# Patient Record
Sex: FEMALE | Race: WHITE | Employment: FULL TIME | URBAN - NONMETROPOLITAN AREA
[De-identification: names, ages, dates, MRNs, and addresses within clinical notes are randomized per-mention and may not be internally consistent; named-entity substitution may affect disease eponyms.]

---

## 2018-03-23 ENCOUNTER — HOSPITAL ENCOUNTER (OUTPATIENT)
Dept: WOMENS IMAGING | Age: 48
Discharge: HOME | End: 2018-03-25
Payer: COMMERCIAL

## 2018-03-23 DIAGNOSIS — Z12.39 ENCOUNTER FOR BREAST CANCER SCREENING OTHER THAN MAMMOGRAM: ICD-10-CM

## 2018-03-23 PROCEDURE — 77067 SCR MAMMO BI INCL CAD: CPT

## 2019-02-14 ENCOUNTER — TELEPHONE (OUTPATIENT)
Dept: GASTROENTEROLOGY | Age: 49
End: 2019-02-14

## 2019-02-14 DIAGNOSIS — R19.4 CHANGE IN BOWEL HABIT: Primary | ICD-10-CM

## 2019-02-14 RX ORDER — SODIUM, POTASSIUM,MAG SULFATES 17.5-3.13G
SOLUTION, RECONSTITUTED, ORAL ORAL
Qty: 2 BOTTLE | Refills: 0 | Status: ON HOLD | OUTPATIENT
Start: 2019-02-14 | End: 2019-02-25 | Stop reason: HOSPADM

## 2019-02-19 RX ORDER — RANITIDINE 150 MG/1
150 TABLET ORAL 2 TIMES DAILY PRN
COMMUNITY

## 2019-02-19 RX ORDER — LEVOTHYROXINE SODIUM 0.07 MG/1
75 TABLET ORAL DAILY
COMMUNITY

## 2019-02-25 ENCOUNTER — HOSPITAL ENCOUNTER (OUTPATIENT)
Age: 49
Setting detail: OUTPATIENT SURGERY
Discharge: HOME | End: 2019-02-25
Attending: INTERNAL MEDICINE | Admitting: INTERNAL MEDICINE
Payer: COMMERCIAL

## 2019-02-25 ENCOUNTER — ANESTHESIA (OUTPATIENT)
Dept: OPERATING ROOM | Age: 49
End: 2019-02-25
Payer: COMMERCIAL

## 2019-02-25 ENCOUNTER — ANESTHESIA EVENT (OUTPATIENT)
Dept: OPERATING ROOM | Age: 49
End: 2019-02-25
Payer: COMMERCIAL

## 2019-02-25 VITALS
DIASTOLIC BLOOD PRESSURE: 63 MMHG | SYSTOLIC BLOOD PRESSURE: 110 MMHG | BODY MASS INDEX: 28.7 KG/M2 | HEIGHT: 63 IN | WEIGHT: 162 LBS | OXYGEN SATURATION: 99 % | TEMPERATURE: 97.4 F | RESPIRATION RATE: 16 BRPM | HEART RATE: 70 BPM

## 2019-02-25 VITALS
OXYGEN SATURATION: 97 % | SYSTOLIC BLOOD PRESSURE: 86 MMHG | DIASTOLIC BLOOD PRESSURE: 44 MMHG | RESPIRATION RATE: 16 BRPM

## 2019-02-25 PROCEDURE — 2580000003 HC RX 258: Performed by: INTERNAL MEDICINE

## 2019-02-25 PROCEDURE — 7100000010 HC PHASE II RECOVERY - FIRST 15 MIN: Performed by: INTERNAL MEDICINE

## 2019-02-25 PROCEDURE — 3700000001 HC ADD 15 MINUTES (ANESTHESIA): Performed by: INTERNAL MEDICINE

## 2019-02-25 PROCEDURE — 6360000002 HC RX W HCPCS: Performed by: NURSE ANESTHETIST, CERTIFIED REGISTERED

## 2019-02-25 PROCEDURE — 2500000003 HC RX 250 WO HCPCS: Performed by: NURSE ANESTHETIST, CERTIFIED REGISTERED

## 2019-02-25 PROCEDURE — 88305 TISSUE EXAM BY PATHOLOGIST: CPT

## 2019-02-25 PROCEDURE — 45380 COLONOSCOPY AND BIOPSY: CPT | Performed by: INTERNAL MEDICINE

## 2019-02-25 PROCEDURE — 2709999900 HC NON-CHARGEABLE SUPPLY: Performed by: INTERNAL MEDICINE

## 2019-02-25 PROCEDURE — 7100000011 HC PHASE II RECOVERY - ADDTL 15 MIN: Performed by: INTERNAL MEDICINE

## 2019-02-25 PROCEDURE — 3609010300 HC COLONOSCOPY W/BIOPSY SINGLE/MULTIPLE: Performed by: INTERNAL MEDICINE

## 2019-02-25 PROCEDURE — 3700000000 HC ANESTHESIA ATTENDED CARE: Performed by: INTERNAL MEDICINE

## 2019-02-25 RX ORDER — PROPOFOL 10 MG/ML
INJECTION, EMULSION INTRAVENOUS CONTINUOUS PRN
Status: DISCONTINUED | OUTPATIENT
Start: 2019-02-25 | End: 2019-02-25 | Stop reason: SDUPTHER

## 2019-02-25 RX ORDER — PROPOFOL 10 MG/ML
INJECTION, EMULSION INTRAVENOUS PRN
Status: DISCONTINUED | OUTPATIENT
Start: 2019-02-25 | End: 2019-02-25 | Stop reason: SDUPTHER

## 2019-02-25 RX ORDER — LIDOCAINE HYDROCHLORIDE 20 MG/ML
INJECTION, SOLUTION INFILTRATION; PERINEURAL PRN
Status: DISCONTINUED | OUTPATIENT
Start: 2019-02-25 | End: 2019-02-25 | Stop reason: SDUPTHER

## 2019-02-25 RX ORDER — SODIUM CHLORIDE, SODIUM LACTATE, POTASSIUM CHLORIDE, CALCIUM CHLORIDE 600; 310; 30; 20 MG/100ML; MG/100ML; MG/100ML; MG/100ML
INJECTION, SOLUTION INTRAVENOUS CONTINUOUS
Status: DISCONTINUED | OUTPATIENT
Start: 2019-02-25 | End: 2019-02-25 | Stop reason: HOSPADM

## 2019-02-25 RX ADMIN — PROPOFOL 80 MG: 10 INJECTION, EMULSION INTRAVENOUS at 16:11

## 2019-02-25 RX ADMIN — SODIUM CHLORIDE, POTASSIUM CHLORIDE, SODIUM LACTATE AND CALCIUM CHLORIDE: 600; 310; 30; 20 INJECTION, SOLUTION INTRAVENOUS at 14:13

## 2019-02-25 RX ADMIN — PROPOFOL 30 MG: 10 INJECTION, EMULSION INTRAVENOUS at 16:16

## 2019-02-25 RX ADMIN — PROPOFOL 200 MCG/KG/MIN: 10 INJECTION, EMULSION INTRAVENOUS at 16:11

## 2019-02-25 RX ADMIN — LIDOCAINE HYDROCHLORIDE 100 MG: 20 INJECTION, SOLUTION INFILTRATION; PERINEURAL at 16:11

## 2019-02-25 ASSESSMENT — PAIN SCALES - GENERAL
PAINLEVEL_OUTOF10: 0

## 2019-02-25 ASSESSMENT — PAIN - FUNCTIONAL ASSESSMENT: PAIN_FUNCTIONAL_ASSESSMENT: 0-10

## 2019-02-27 LAB — SURGICAL PATHOLOGY REPORT: NORMAL

## 2019-02-28 ENCOUNTER — HOSPITAL ENCOUNTER (OUTPATIENT)
Dept: NON INVASIVE DIAGNOSTICS | Age: 49
Discharge: HOME | End: 2019-02-28
Payer: COMMERCIAL

## 2019-02-28 LAB
LV EF: 55 %
LVEF MODALITY: NORMAL

## 2019-02-28 PROCEDURE — 93306 TTE W/DOPPLER COMPLETE: CPT

## 2019-03-25 ENCOUNTER — HOSPITAL ENCOUNTER (OUTPATIENT)
Dept: WOMENS IMAGING | Age: 49
Discharge: HOME | End: 2019-03-27
Payer: COMMERCIAL

## 2019-03-25 DIAGNOSIS — Z12.39 SCREENING BREAST EXAMINATION: ICD-10-CM

## 2019-03-25 PROCEDURE — 77063 BREAST TOMOSYNTHESIS BI: CPT

## 2020-06-17 ENCOUNTER — HOSPITAL ENCOUNTER (OUTPATIENT)
Dept: WOMENS IMAGING | Age: 50
Discharge: HOME | End: 2020-06-19
Payer: COMMERCIAL

## 2020-06-17 PROCEDURE — 77063 BREAST TOMOSYNTHESIS BI: CPT

## 2020-08-11 ENCOUNTER — HOSPITAL ENCOUNTER (EMERGENCY)
Age: 50
Discharge: HOME | End: 2020-08-11
Attending: EMERGENCY MEDICINE
Payer: COMMERCIAL

## 2020-08-11 VITALS
RESPIRATION RATE: 12 BRPM | DIASTOLIC BLOOD PRESSURE: 63 MMHG | BODY MASS INDEX: 30.12 KG/M2 | HEIGHT: 63 IN | OXYGEN SATURATION: 97 % | SYSTOLIC BLOOD PRESSURE: 145 MMHG | HEART RATE: 66 BPM | WEIGHT: 170 LBS | TEMPERATURE: 97.8 F

## 2020-08-11 LAB
DIRECT EXAM: NORMAL
Lab: NORMAL
SPECIMEN DESCRIPTION: NORMAL

## 2020-08-11 PROCEDURE — 87880 STREP A ASSAY W/OPTIC: CPT

## 2020-08-11 PROCEDURE — 99283 EMERGENCY DEPT VISIT LOW MDM: CPT

## 2020-08-11 PROCEDURE — 6370000000 HC RX 637 (ALT 250 FOR IP): Performed by: EMERGENCY MEDICINE

## 2020-08-11 RX ORDER — IBUPROFEN 800 MG/1
800 TABLET ORAL EVERY 8 HOURS PRN
Qty: 21 TABLET | Refills: 0 | Status: SHIPPED | OUTPATIENT
Start: 2020-08-11

## 2020-08-11 RX ORDER — IBUPROFEN 800 MG/1
800 TABLET ORAL ONCE
Status: COMPLETED | OUTPATIENT
Start: 2020-08-11 | End: 2020-08-11

## 2020-08-11 RX ADMIN — BENZOCAINE AND MENTHOL 1 LOZENGE: 15; 3.6 LOZENGE ORAL at 09:54

## 2020-08-11 RX ADMIN — IBUPROFEN 800 MG: 800 TABLET, FILM COATED ORAL at 09:54

## 2020-08-11 ASSESSMENT — ENCOUNTER SYMPTOMS
CONSTIPATION: 0
SHORTNESS OF BREATH: 0
DIARRHEA: 0
SORE THROAT: 1
WHEEZING: 0
COUGH: 1
VOMITING: 0
RHINORRHEA: 0
NAUSEA: 0
ABDOMINAL DISTENTION: 0

## 2020-08-11 ASSESSMENT — PAIN SCALES - GENERAL
PAINLEVEL_OUTOF10: 7
PAINLEVEL_OUTOF10: 7

## 2020-08-11 ASSESSMENT — PAIN DESCRIPTION - PAIN TYPE: TYPE: ACUTE PAIN

## 2020-08-11 ASSESSMENT — PAIN DESCRIPTION - LOCATION: LOCATION: THROAT

## 2020-08-11 NOTE — ED PROVIDER NOTES
[facility] ED  Emergency Department        Pt Name: Myra Phalen  MRN: [id number]  [place] 1970  Date of evaluation: 8/11/20    CHIEF COMPLAINT       Chief Complaint   Patient presents with    Pharyngitis     Onset of sore throat headache and cough 2 days ago. Reported to work this morning and was sent home. Temp 97.8    Cough       HISTORY OF PRESENT ILLNESS  (Location/Symptom, Timing/Onset, Context/Setting, Quality, Duration, ModifyingFactors, Severity.)      Myra Phalen is a 47 y.o. female who presents with sore throat that started yesterday, and cough, also present with headache and feeling post nasal drip. No fever, no chest pain, no body aches. She is tolerating oral intake. was sent from work for ER evaluation, patient took sudafed without relief of symptom. PAST MEDICAL / SURGICAL / SOCIAL / FAMILY HISTORY      has a past medical history of Polymyositis ([address] and Thyroid disease. has a past surgical history that includes Hysterectomy (2013); Colonoscopy (02/25/2019); and Colonoscopy (N/A, 2/25/2019).        Social History     Socioeconomic History    Marital status:      Spouse name: Not on file    Number of children: Not on file    Years of education: Not on file    Highest education level: Not on file   Occupational History    Not on file   Social Needs    Financial resource strain: Not on file    Food insecurity     Worry: Not on file     Inability: Not on file    Transportation needs     Medical: Not on file     Non-medical: Not on file   Tobacco Use    Smoking status: Never Smoker    Smokeless tobacco: Never Used   Substance and Sexual Activity    Alcohol use: Not Currently    Drug use: Never    Sexual activity: Not on file   Lifestyle    Physical activity     Days per week: Not on file     Minutes per session: Not on file    Stress: Not on file   Relationships    Social connections     Talks on phone: Not on file     Gets together: Not on file Attends Denominational service: Not on file     Active member of club or organization: Not on file     Attends meetings of clubs or organizations: Not on file     Relationship status: Not on file    Intimate partner violence     Fear of current or ex partner: Not on file     Emotionally abused: Not on file     Physically abused: Not on file     Forced sexual activity: Not on file   Other Topics Concern    Not on file   Social History Narrative    Not on file       History reviewed. No pertinent family history. Allergies:  Pantoprazole    Home Medications:  Prior to Admission medications    Medication Sig Start Date End Date Taking? Authorizing Provider   ibuprofen (IBU) 800 MG tablet Take 1 tablet by mouth every 8 hours as needed for Pain 8/11/20  Yes Alma Matthews, DO   benzocaine-menthol (CEPACOL SORE THROAT) 15-3.6 MG lozenge Take 1 lozenge by mouth every 2 hours as needed for Sore Throat 8/11/20  Yes Alma Matthews, DO   Multiple Vitamins-Calcium (ONE-A-DAY WITHIN PO) Take by mouth   Yes Historical Provider, MD   levothyroxine (SYNTHROID) 75 MCG tablet Take 75 mcg by mouth Daily   Yes Historical Provider, MD   ranitidine (ZANTAC) 150 MG tablet Take 150 mg by mouth 2 times daily as needed for Heartburn    Historical Provider, MD   vitamin D (CHOLECALCIFEROL) 1000 UNIT TABS tablet Take 1,000 Units by mouth 2 times daily    Historical Provider, MD       REVIEW OF SYSTEMS    (2-9 systems for level 4, 10 or more for level 5)      Review of Systems   Constitutional: Negative for activity change, appetite change, fatigue and fever. HENT: Positive for postnasal drip and sore throat. Negative for congestion and rhinorrhea. Respiratory: Positive for cough. Negative for shortness of breath and wheezing. Cardiovascular: Negative for chest pain, palpitations and leg swelling. Gastrointestinal: Negative for abdominal distention, constipation, diarrhea, nausea and vomiting.    Genitourinary: Negative for decreased urine volume and dysuria. Skin: Negative for rash. Neurological: Negative for dizziness, weakness, light-headedness, numbness and headaches. PHYSICAL EXAM   (up to 7 for level 4, 8 or more for level 5)     INITIAL VITALS:   BP (!) 145/63   Pulse 66   Temp 97.8 °F (36.6 °C)   Resp 12   Ht 5' 3\" (1.6 m)   Wt 170 lb (77.1 kg)   SpO2 97%   BMI 30.11 kg/m²     Physical Exam  Vitals signs and nursing note reviewed. Constitutional:       General: She is not in acute distress. Appearance: She is well-developed. Comments: Non toxic appearing, speaking in full sentences without signs of distress   HENT:      Head: Normocephalic and atraumatic. Nose: No congestion. Mouth/Throat:      Mouth: Mucous membranes are moist.      Pharynx: Posterior oropharyngeal erythema present. Eyes:      General:         Right eye: No discharge. Left eye: No discharge. Extraocular Movements: Extraocular movements intact. Conjunctiva/sclera: Conjunctivae normal.      Pupils: Pupils are equal, round, and reactive to light. Cardiovascular:      Rate and Rhythm: Normal rate and regular rhythm. Heart sounds: Normal heart sounds. No murmur. No friction rub. No gallop. Pulmonary:      Effort: Pulmonary effort is normal. No respiratory distress. Breath sounds: Normal breath sounds. No stridor. No wheezing, rhonchi or rales. Chest:      Chest wall: No tenderness. Abdominal:      General: There is no distension. Palpations: Abdomen is soft. There is no mass. Tenderness: There is no abdominal tenderness. There is no right CVA tenderness, left CVA tenderness, guarding or rebound. Hernia: No hernia is present. Skin:     General: Skin is warm and dry. Findings: No rash. Neurological:      Mental Status: She is alert and oriented to person, place, and time.          DIFFERENTIAL  DIAGNOSIS     Sore throat, headache, and mild cough, lungs CTAB without any wheezing on exam, no resp distress, no hypoxia noted. Mild erythema to post pharynx. Possible Covd? But patient very well appearing, plan for strep screen, and treat conservatively with nsaids, and cepachol at this time, she will need pcp follow up    PLAN (LABS / IMAGING / EKG):  Orders Placed This Encounter   Procedures    Strep Screen Group A Throat       MEDICATIONS ORDERED:  Orders Placed This Encounter   Medications    ibuprofen (ADVIL;MOTRIN) tablet 800 mg    benzocaine-menthol (CEPACOL SORE THROAT) lozenge 1 lozenge    ibuprofen (IBU) 800 MG tablet     Sig: Take 1 tablet by mouth every 8 hours as needed for Pain     Dispense:  21 tablet     Refill:  0    benzocaine-menthol (CEPACOL SORE THROAT) 15-3.6 MG lozenge     Sig: Take 1 lozenge by mouth every 2 hours as needed for Sore Throat     Dispense:  20 lozenge     Refill:  0       DIAGNOSTIC RESULTS / EMERGENCY DEPARTMENT COURSE / MDM     LABS:  No results found for this visit on 08/11/20. IMPRESSION: URI        EMERGENCY DEPARTMENT COURSE:  Patient updated on results of swab tests. And at home with conservative management she can follow-up with her primary care doctor for further evaluation. She knows to return to the ER for worsening pain, swelling of her throat difficulty swallowing or difficulty breathing. FINAL IMPRESSION      1.  Acute pharyngitis, unspecified etiology          DISPOSITION / PLAN     DISPOSITION Discharge - Pending Orders Complete 08/11/2020 09:42:09 AM      PATIENT REFERRED TO:  Angelyn Brunner Rine  [address] [place] [id number]  [phone]    Schedule an appointment as soon as possible for a visit in 2 days        DISCHARGE MEDICATIONS:  New Prescriptions    BENZOCAINE-MENTHOL (CEPACOL SORE THROAT) 15-3.6 MG LOZENGE    Take 1 lozenge by mouth every 2 hours as needed for Sore Throat    IBUPROFEN (IBU) 800 MG TABLET    Take 1 tablet by mouth every 8 hours as needed for Pain       Christina Morales  9:44 AM    Attending

## 2020-08-12 ENCOUNTER — CARE COORDINATION (OUTPATIENT)
Dept: CARE COORDINATION | Age: 50
End: 2020-08-12

## 2020-08-12 NOTE — CARE COORDINATION
Attempted call to pt for COVID-19 Monitoring. There was no answer, HC left a message for   pt, and provided contact information for a   return call. If no response today, HC will make  a second attempt on 08/13/20.

## 2020-08-13 ENCOUNTER — CARE COORDINATION (OUTPATIENT)
Dept: CARE COORDINATION | Age: 50
End: 2020-08-13

## 2020-08-13 NOTE — CARE COORDINATION
2nd Attempt, there was no answer, HC left a   message for pt and provided contact information  for a return call. If no response, HC will resolve  this episode.

## 2021-11-05 ENCOUNTER — HOSPITAL ENCOUNTER (OUTPATIENT)
Dept: WOMENS IMAGING | Age: 51
Discharge: HOME | End: 2021-11-07
Payer: COMMERCIAL

## 2021-11-05 DIAGNOSIS — Z12.31 ENCOUNTER FOR SCREENING MAMMOGRAM FOR MALIGNANT NEOPLASM OF BREAST: ICD-10-CM

## 2021-11-05 PROCEDURE — 77063 BREAST TOMOSYNTHESIS BI: CPT

## 2022-06-08 ENCOUNTER — HOSPITAL ENCOUNTER (OUTPATIENT)
Dept: PHYSICAL THERAPY | Age: 52
Setting detail: THERAPIES SERIES
Discharge: HOME | End: 2022-06-08
Payer: COMMERCIAL

## 2022-06-08 PROCEDURE — 97161 PT EVAL LOW COMPLEX 20 MIN: CPT

## 2022-06-08 PROCEDURE — 97110 THERAPEUTIC EXERCISES: CPT

## 2022-06-08 ASSESSMENT — PAIN SCALES - QUEBEC BACK PAIN DISABILITY SCALE
QUEBEC CMS MODIFIER: CJ
STAND UP FOR 20 TO 30 MINUTES: 1
PUT ON SOCKS OR PANYHOSE: 1
TAKE FOOD OUT OF THE REFRIGERATOR: 1
THROW A BALL: 5
SLEEP THROUGH THE NIGHT: 2
MOVE A CHAIR: 1
WALK SEVERAL KILOMETERS  OR MILES: 2
REACH UP TO HIGH SHELVES: 1
PULL OR PUSH HEAVY DOORS: 2
BEND OVER TO CLEAN THE BATHTUB: 2
QUEBEC DISABILITY INDEX: 20-39%
LIFT AND CARRY A HEAVY SUITCASE: 3
CLIMB ONE FLIGHT OF STAIRS: 4
SIT IN A CHAIR FOR SEVERAL HOURS: 0
CARRY TWO BAGS OF GROCERIES: 1
RIDE IN A CAR: 1
TURN OVER IN BED: 1
GET OUT OF BED: 1
RUN ONE BLOCK OR 100M: 5
MAKE YOUR BED: 1
WALK A FEW BLOCKS OR 300 TO 400M: 1
TOTAL SCORE: 36

## 2022-06-08 NOTE — PLAN OF CARE
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 Patient stands with lumbar hyperlordosis and B anterior pelvic tilt. Patient with decreased core strength: 4-/5 grossly and decreased B LE strength 4-/4/5 grossly. Patient with decreased lumbar AROM flexion: to toes and B SB to knees with increased pulling type pain. (-) B slump, SLR, (-) B BOBY with good pelvic alignment. Relief of pain with manual lumbar traction with therapy ball. Patient to benefit from physical therapy to decrease pain and improve core and B hip strength and improve lumbar AROM to return to PLOF and be able to complete functional tasks at work. Goals  Short Term Goals  Time Frame for Short term goals: 3 weeks  Short term goal 1: Patient to initiate HEP for improved core strength and lumbar stability. Short term goal 2: Patient to be instructed in lumbar ROM and core strengthening exercise to improve lumbar stability. Short term goal 3: Initiate manual techniques/modalities prn to decrease pain and improve mobility.   Long Term Goals  Time Frame for Long term goals : 6 weeks  Long term goal 1: Patient to be independent and compliant with HEP. Long term goal 2: Patient to have improved core and B hip strength >/=4+/5 grossly for improved lumbar stability. Long term goal 3: Patient to have improved lumbar AROM flexion: to floor and B SB to knees with no increase in pain to improve functional mobility. Long term goal 4: Patient to report >/=75% improvement in symptoms and be able to work a 10 hour shift with no increase in pain to improve QOL. Long term goal 5: Patient to be able to ascend/descend a full flight of stairs with no increase in pain or weakness in B thighs to improve mobility.      Prognosis  Therapy Prognosis: Good    Treatment Plan   Plan Frequency: 2  Plan weeks: 6  [x] HP/CP      [x] Electrical Stim   [x] Therapeutic Exercise      [x] Gait Training  [x] Aquatics   [] Ultrasound         [x] Patient Education/HEP   [x] Manual Therapy  [x] Traction    [x] Neuro-lulu        [x] Soft Tissue Mobs            [] Home TENS  [] Iontophoresis    [] Orthotic casting/fitting      [] Dry Needling             Electronically signed by: Andrew Acuña PT, DPT    Date: 6/8/2022      ______________________________________ Date: 6/8/2022   Physician Signature

## 2022-06-08 NOTE — PROGRESS NOTES
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 Patient reports a lot of lifting and carrying but only bothers her when she has to squat down or climb stairs. Does notice some aching in the legs towards the end of her work day. Patient reports 4-8/10 pain depending on what activities she is doing.   Additional Pertinent Hx: thyroid problems    Observations:   General Observations  Description: Patient stands with lumbar hyperlordosis and B anterior pelvic tilt    Objective    Strength       Strength LLE  L Hip Flexion: 4-/5  L Hip ABduction: 4/5  L Hip ADduction: 4/5  L Knee Flexion: 4/5  L Knee Extension: 4/5       Lumbar Assessment     AROM Lumbar Spine   Lumbar spine general AROM: flexion: to toes with pain; B SB: to knees with pulling pain     Special Tests:     Strength RLE  R Hip Flexion: 4/5  R Hip ABduction: 4/5  R Hip ADduction: 4+/5  R Knee Flexion: 4/5  R Knee Extension: 4/5  Strength LLE  L Hip Flexion: 4-/5  L Hip ABduction: 4/5  L Hip ADduction: 4/5  L Knee Flexion: 4/5  L Knee Extension: 4/5    Exercises:  Exercise 1: HEP: supine LTR, bridges, PPT, marching    Manual:  Manual Traction: Gentle lumbar traction with therapy ball with relief of back pain noted    Functional Outcome Measures  Get out of bed: Minimally difficult  Sleep through the night: Somewhat difficult  Turn over in bed: Minimally difficult  Ride in a car: Minimally difficult  Stand up for 20-30 minutes: Minimally difficult  Sit in a chair for several hours: Not difficult at all  Climb one flight of stairs: Very difficult  Walk a few blocks (300-400 m): Minimally difficult  Walk several kilometers - miles: Somewhat difficult  Reach up to high shelves: Minimally difficult  Throw a ball: Unable to do  Run one block (about 100 m): Unable to do  Take food out of the refrigerator: Minimally difficult  Make your bed: Minimally difficult  Put on socks (pantyhose): Minimally difficult  Bend over to clean the bathtub: Somewhat difficult  Move a chair: Minimally difficult  Pull or push heavy doors: Somewhat difficult  Carry two bags of groceries: Minimally difficult  Lift and carry a heavy suitcase: Fairly difficult  Rodrick Total Score: 36    Assessment  Assessment: Patient is 45year old female with dx of lumbosacral neuropathy and LE weakness who presents with increased pain 4-8/10 depending on what activity she is doing. Patient stands with lumbar hyperlordosis and B anterior pelvic tilt. Patient with decreased core strength: 4-/5 grossly and decreased B LE strength 4-/4/5 grossly. Patient with decreased lumbar AROM flexion: to toes and B SB to knees with increased pulling type pain. (-) B slump, SLR, (-) B BOBY with good pelvic alignment. Relief of pain with manual lumbar traction with therapy ball. Patient to benefit from physical therapy to decrease pain and improve core and B hip strength and improve lumbar AROM to return to PLOF and be able to complete functional tasks at work.   Therapy Prognosis: Good        Decision Making: Low Complexity    Patient Education  PT eval, POC, HEP    Pt verbalized/demonstrated good understanding:     [X] Yes         [] No, pt required further clarification. Goals  Short Term Goals  Time Frame for Short term goals: 3 weeks  Short term goal 1: Patient to initiate HEP for improved core strength and lumbar stability. Short term goal 2: Patient to be instructed in lumbar ROM and core strengthening exercise to improve lumbar stability. Short term goal 3: Initiate manual techniques/modalities prn to decrease pain and improve mobility. Long Term Goals  Time Frame for Long term goals : 6 weeks  Long term goal 1: Patient to be independent and compliant with HEP. Long term goal 2: Patient to have improved core and B hip strength >/=4+/5 grossly for improved lumbar stability. Long term goal 3: Patient to have improved lumbar AROM flexion: to floor and B SB to knees with no increase in pain to improve functional mobility. Long term goal 4: Patient to report >/=75% improvement in symptoms and be able to work a 10 hour shift with no increase in pain to improve QOL. Long term goal 5: Patient to be able to ascend/descend a full flight of stairs with no increase in pain or weakness in B thighs to improve mobility.       Minutes Tracking:  Time In: Dean  Time Out: 1628  Minutes: 38  Timed Code Treatment Minutes: [address], PT, DPT    6/8/2022

## 2022-06-14 ENCOUNTER — HOSPITAL ENCOUNTER (OUTPATIENT)
Dept: PHYSICAL THERAPY | Age: 52
Setting detail: THERAPIES SERIES
Discharge: HOME | End: 2022-06-14
Payer: COMMERCIAL

## 2022-06-14 PROCEDURE — 97110 THERAPEUTIC EXERCISES: CPT

## 2022-06-14 PROCEDURE — 97140 MANUAL THERAPY 1/> REGIONS: CPT

## 2022-06-14 NOTE — PROGRESS NOTES
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 Difficulty climbing stairs at work today. Reports compliance with HEP. Exercises:  Exercise 1: HEP: supine LTR, bridges, PPT, marching  Exercise 2: Scifit x6 min, hills, level 2.5  Exercise 3: Seated ball rollouts x10 ea way  Exercise 4: Standing ball abd iso's [id number] holds  Exercise 5: Standing PPT's against wall with marching 10x  Exercise 6: BTB rows/ext x15  Exercise 7: Sit/stands with 5#, x10  Exercise 8: Supine LTR, bridges, PPT, marching x15 ea  Exercise 9: Prone prop on elbows x2 min to decrease radicular symptoms    Manual:  Manual Traction: Gentle lumbar traction with therapy ball with relief of back pain noted    Assessment  Assessment: Noted increased radicular pain in B thighs with flexion-based exercise today; initiated prone prop on elbows to decrease symptoms and patient tolerated well. Instructed patient to continue core strengthening at home and add extension based exercises at home and at work to decrease symptoms. Patient demo's good understanding. Ended with supine manual lumbar traction with therapy ball to decrease pain. Will progress as able.     Activity Tolerance  Activity Tolerance: Patient tolerated treatment well    Patient Education  Exercise technique and rationale    Pt verbalized/demonstrated good understanding:     [x] Yes         [] No, pt required further clarification. Post Treatment Pain:  1/10      Plan  Plan Frequency: 2  Plan weeks: 6       Goals  (Total # of Visits to Date: 2)      Short Term Goals  Time Frame for Short term goals: 3 weeks  Short term goal 1: Patient to initiate HEP for improved core strength and lumbar stability. -met  Short term goal 2: Patient to be instructed in lumbar ROM and core strengthening exercise to improve lumbar stability.-met/cont  Short term goal 3: Initiate manual techniques/modalities prn to decrease pain and improve mobility.-met/cont    Long Term Goals  Time Frame for Long term goals : 6 weeks  Long term goal 1: Patient to be independent and compliant with HEP. Long term goal 2: Patient to have improved core and B hip strength >/=4+/5 grossly for improved lumbar stability. Long term goal 3: Patient to have improved lumbar AROM flexion: to floor and B SB to knees with no increase in pain to improve functional mobility. Long term goal 4: Patient to report >/=75% improvement in symptoms and be able to work a 10 hour shift with no increase in pain to improve QOL. Long term goal 5: Patient to be able to ascend/descend a full flight of stairs with no increase in pain or weakness in B thighs to improve mobility.     Minutes Tracking:  Time In: 1429  Time Out: 1510  Minutes: 41  Timed Code Treatment Minutes: [address], PT , DPT     Date: 6/14/2022

## 2022-06-16 ENCOUNTER — HOSPITAL ENCOUNTER (OUTPATIENT)
Dept: PHYSICAL THERAPY | Age: 52
Setting detail: THERAPIES SERIES
Discharge: HOME | End: 2022-06-16
Payer: COMMERCIAL

## 2022-06-16 PROCEDURE — 97110 THERAPEUTIC EXERCISES: CPT

## 2022-06-16 PROCEDURE — 97140 MANUAL THERAPY 1/> REGIONS: CPT

## 2022-06-17 NOTE — PROGRESS NOTES
Short term goals: 3 weeks  Short term goal 1: Patient to initiate HEP for improved core strength and lumbar stability. -met  Short term goal 2: Patient to be instructed in lumbar ROM and core strengthening exercise to improve lumbar stability.-met/cont  Short term goal 3: Initiate manual techniques/modalities prn to decrease pain and improve mobility.-met/cont    Long Term Goals  Time Frame for Long term goals : 6 weeks  Long term goal 1: Patient to be independent and compliant with HEP. Long term goal 2: Patient to have improved core and B hip strength >/=4+/5 grossly for improved lumbar stability. Long term goal 3: Patient to have improved lumbar AROM flexion: to floor and B SB to knees with no increase in pain to improve functional mobility. Long term goal 4: Patient to report >/=75% improvement in symptoms and be able to work a 10 hour shift with no increase in pain to improve QOL. Long term goal 5: Patient to be able to ascend/descend a full flight of stairs with no increase in pain or weakness in B thighs to improve mobility.     Minutes Tracking:  Time In: [address]  Time Out: 6297  Minutes: 48  Timed Code Treatment Minutes: [address], [place]          Date: 6/16/2022

## 2022-06-22 ENCOUNTER — HOSPITAL ENCOUNTER (OUTPATIENT)
Dept: PHYSICAL THERAPY | Age: 52
Setting detail: THERAPIES SERIES
Discharge: HOME | End: 2022-06-22
Payer: COMMERCIAL

## 2022-06-22 PROCEDURE — 97140 MANUAL THERAPY 1/> REGIONS: CPT

## 2022-06-22 PROCEDURE — 97110 THERAPEUTIC EXERCISES: CPT

## 2022-06-22 NOTE — PROGRESS NOTES
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 Exercises:  Exercise 2: Scifit x8 min, hills, level 2.5  Exercise 4: Standing ball abd iso's [id number] holds  Exercise 7: Sit/stands with 5#, 2x10  Exercise 8: Supine LTR, bridges, PPT x15 ea  Exercise 9: Prone prop on elbows x4 min to decrease radicular symptoms  Exercise 10: sideways amb at counter 3 laps GTB  Exercise 11: Prone knee flex 15x and press ups 15x  Exercise 12: standing hip ext alt 15x ea, back bends 15x    Manual:  Manual Traction: Gentle lumbar traction with therapy ball with relief of back pain noted    Assessment  Assessment: Increased reps of sit/stands to progress functional strength and endurance; patient with fair tolerance. Mild increase in B LE pain following supine core strengthening but this dissipates with manual traction with therapy ball. Will continue. Activity Tolerance  Activity Tolerance: Patient tolerated treatment well    Patient Education  Exercise technique    Pt verbalized/demonstrated good understanding:     [x] Yes         [] No, pt required further clarification.        Post Treatment Pain:  0/10      Plan  Plan Frequency: 2  Plan weeks: 6       Goals  (Total # of Visits to Date: 4)      Short Term Goals  Time Frame for Short term goals: 3 weeks  Short term goal 1: Patient to initiate HEP for improved core strength and lumbar stability. -met  Short term goal 2: Patient to be instructed in lumbar ROM and core strengthening exercise to improve lumbar stability.-met/cont  Short term goal 3: Initiate manual techniques/modalities prn to decrease pain and improve mobility.-met/cont    Long Term Goals  Time Frame for Long term goals : 6 weeks  Long term goal 1: Patient to be independent and compliant with HEP. Long term goal 2: Patient to have improved core and B hip strength >/=4+/5 grossly for improved lumbar stability. Long term goal 3: Patient to have improved lumbar AROM flexion: to floor and B SB to knees with no increase in pain to improve functional mobility. Long term goal 4: Patient to report >/=75% improvement in symptoms and be able to work a 10 hour shift with no increase in pain to improve QOL. Long term goal 5: Patient to be able to ascend/descend a full flight of stairs with no increase in pain or weakness in B thighs to improve mobility.     Minutes Tracking:  Time In: 4064  Time Out: 1556  Minutes: 42  Timed Code Treatment Minutes: [address] [facility]. [address], PT , DPT     Date: 6/22/2022

## 2022-06-24 ENCOUNTER — HOSPITAL ENCOUNTER (OUTPATIENT)
Dept: PHYSICAL THERAPY | Age: 52
Setting detail: THERAPIES SERIES
Discharge: HOME | End: 2022-06-24
Payer: COMMERCIAL

## 2022-06-24 PROCEDURE — 97140 MANUAL THERAPY 1/> REGIONS: CPT

## 2022-06-24 PROCEDURE — 97110 THERAPEUTIC EXERCISES: CPT

## 2022-06-24 NOTE — PROGRESS NOTES
[] No, pt required further clarification. Post Treatment Pain:  0/10      Plan  Plan Frequency: 2  Plan weeks: 6       Goals  (Total # of Visits to Date: 5)      Short Term Goals  Time Frame for Short term goals: 3 weeks  Short term goal 1: Patient to initiate HEP for improved core strength and lumbar stability. -met  Short term goal 2: Patient to be instructed in lumbar ROM and core strengthening exercise to improve lumbar stability.-met/cont  Short term goal 3: Initiate manual techniques/modalities prn to decrease pain and improve mobility.-met/cont    Long Term Goals  Time Frame for Long term goals : 6 weeks  Long term goal 1: Patient to be independent and compliant with HEP. Long term goal 2: Patient to have improved core and B hip strength >/=4+/5 grossly for improved lumbar stability. Long term goal 3: Patient to have improved lumbar AROM flexion: to floor and B SB to knees with no increase in pain to improve functional mobility. Long term goal 4: Patient to report >/=75% improvement in symptoms and be able to work a 10 hour shift with no increase in pain to improve QOL. Long term goal 5: Patient to be able to ascend/descend a full flight of stairs with no increase in pain or weakness in B thighs to improve mobility.     Minutes Tracking:  Time In: 7421  Time Out: 1540  Minutes: 44  Timed Code Treatment Minutes: [address] [facility], PT , DPT     Date: 6/24/2022

## 2022-06-28 ENCOUNTER — HOSPITAL ENCOUNTER (OUTPATIENT)
Dept: PHYSICAL THERAPY | Age: 52
Setting detail: THERAPIES SERIES
Discharge: HOME | End: 2022-06-28
Payer: COMMERCIAL

## 2022-06-28 PROCEDURE — 97110 THERAPEUTIC EXERCISES: CPT

## 2022-06-29 NOTE — PROGRESS NOTES
Phone: Deja           Fax: [phone]                           Outpatient Physical Therapy                                                                            Daily Note    Patient: Naomie Mcdaniel : 1970  CSN #: [id number]   Referring Physician: Arabella Way MD    Date: 2022     Treatment Diagnosis: Generalized weakness; low back pain    Onset Date: 20  Total # of Visits Approved: 12 Per Physician Order  Total # of Visits to Date: 6  No Show: 0  Canceled Appointment: 0    Pre-Treatment Pain:  0/10  Subjective: Pt reports she currently isnt having back pain but her legs are a little uncomfortable. Pt denies pain rating. Exercises:  Exercise 2: Scifit x10 min, hills, level 3.0  Exercise 4: Standing ball abd iso's [id number] holds  Exercise 6: BTB rows/ext x15; GTB paloff press x15  Exercise 7: Sit/stands with 5#, 2x10  Exercise 9: Prone prop on elbows x4 min to decrease radicular symptoms  Exercise 10: sideways amb at counter 3 laps BTB  Exercise 11: Prone knee flex 15x and press ups 15x  Exercise 12: standing hip ext alt 15x ea, back bends 15x  Exercise 13: debra retro 14# 5x  Exercise 14: FSU/ LSU 15x ea 6 inch  Exercise 15: sideways amb GTB 3 laps    Assessment  Assessment: Increased LE strengthening program today with fair tolerance noted. Pt reports knee pain with repetitive sit<>stands. Pt amb with Daniel hip IR and knock knee gait pattern. Will continue to progress as tolerable. Activity Tolerance  Activity Tolerance: Patient tolerated treatment well    Patient Education  Patient Education: Exercise rationale. Pt verbalized/demonstrated good understanding:     [x] Yes         [] No, pt required further clarification.      Post Treatment Pain:  0/10    Plan  Plan Frequency: 2  Plan weeks: 6     Goals  (Total # of Visits to Date: 6)      Short Term Goals  Time Frame for Short term goals: 3 weeks  Short term goal 1: Patient to initiate HEP for improved core strength and lumbar stability. -met  Short term goal 2: Patient to be instructed in lumbar ROM and core strengthening exercise to improve lumbar stability.-met/cont  Short term goal 3: Initiate manual techniques/modalities prn to decrease pain and improve mobility.-met/cont    Long Term Goals  Time Frame for Long term goals : 6 weeks  Long term goal 1: Patient to be independent and compliant with HEP. Long term goal 2: Patient to have improved core and B hip strength >/=4+/5 grossly for improved lumbar stability. Long term goal 3: Patient to have improved lumbar AROM flexion: to floor and B SB to knees with no increase in pain to improve functional mobility. Long term goal 4: Patient to report >/=75% improvement in symptoms and be able to work a 10 hour shift with no increase in pain to improve QOL. Long term goal 5: Patient to be able to ascend/descend a full flight of stairs with no increase in pain or weakness in B thighs to improve mobility.     Minutes Tracking:  Time In: 6714  Time Out: [address] 70  Minutes: 44  Timed Code Treatment Minutes: Svetlana López           Date: 6/28/2022

## 2022-06-30 ENCOUNTER — HOSPITAL ENCOUNTER (OUTPATIENT)
Dept: PHYSICAL THERAPY | Age: 52
Setting detail: THERAPIES SERIES
Discharge: HOME | End: 2022-06-30
Payer: COMMERCIAL

## 2022-06-30 PROCEDURE — 97110 THERAPEUTIC EXERCISES: CPT

## 2022-07-01 NOTE — PROGRESS NOTES
Phone: Deja           Fax: [phone]                           Outpatient Physical Therapy                                                                            Daily Note    Patient: Rivera Davies : 1970  CSN #: [id number]   Referring Physician: Jacinto Levy MD    Date: 2022     Treatment Diagnosis: Generalized weakness; low back pain    Onset Date: 20  Total # of Visits Approved: 12 Per Physician Order  Total # of Visits to Date: 7  No Show: 0  Canceled Appointment: 0    Pre-Treatment Pain:  0/10  Subjective: Pt reports her legs are just fatigued. Pt denies current pain at rest.    Exercises:  Exercise 2: Scifit x10 min, hills, level 3.0  Exercise 4: Standing ball abd iso's [id number] holds  Exercise 6: BTB rows/ext x15; GTB paloff press x15  Exercise 10: sideways amb at counter 3 laps BTB  Exercise 12: standing hip ext alt 15x ea, back bends 15x  Exercise 13: debra retro 14# 5x  Exercise 14: FSU/ LSU 15x ea 6 inch  Exercise 15: sideways amb GTB 3 laps  Exercise 16: joystick 15x ea    Assessment  Assessment: Pt able to FF fingertips 2 inches to floor, sidebend L and R equal fingertips to knee joint and extend [facility] with stretching noted. COntinued to advance core and LE strengthening program.    Activity Tolerance  Activity Tolerance: Patient tolerated treatment well    Patient Education  Patient Education: Exercise rationale. Pt verbalized/demonstrated good understanding:     [x] Yes         [] No, pt required further clarification. Post Treatment Pain:  0/10    Plan  Plan Frequency: 2  Plan weeks: 6     Goals  (Total # of Visits to Date: 7)      Short Term Goals  Time Frame for Short term goals: 3 weeks  Short term goal 1: Patient to initiate HEP for improved core strength and lumbar stability. -met  Short term goal 2: Patient to be instructed in lumbar ROM and core strengthening exercise to improve lumbar stability.-met/cont  Short term goal 3: Initiate manual techniques/modalities prn to decrease pain and improve mobility.-met/cont    Long Term Goals  Time Frame for Long term goals : 6 weeks  Long term goal 1: Patient to be independent and compliant with HEP. Long term goal 2: Patient to have improved core and B hip strength >/=4+/5 grossly for improved lumbar stability. Long term goal 3: Patient to have improved lumbar AROM flexion: to floor and B SB to knees with no increase in pain to improve functional mobility. Long term goal 4: Patient to report >/=75% improvement in symptoms and be able to work a 10 hour shift with no increase in pain to improve QOL. Long term goal 5: Patient to be able to ascend/descend a full flight of stairs with no increase in pain or weakness in B thighs to improve mobility.     Minutes Tracking:  Time In: 6010  Time Out: 1701  Minutes: 44  Timed Code Treatment Minutes: [address] [place], [place]          Date: 6/30/2022

## 2022-07-12 ENCOUNTER — HOSPITAL ENCOUNTER (OUTPATIENT)
Dept: PHYSICAL THERAPY | Age: 52
Setting detail: THERAPIES SERIES
Discharge: HOME | End: 2022-07-12
Payer: COMMERCIAL

## 2022-07-12 PROCEDURE — 97110 THERAPEUTIC EXERCISES: CPT

## 2022-07-13 NOTE — PROGRESS NOTES
Phone: Deja           Fax: [phone]                           Outpatient Physical Therapy                                                                            Daily Note    Patient: Ilda Bland : 1970  CSN #: [id number]   Referring Physician: Kimberli Le MD    Date: 2022     Treatment Diagnosis: Generalized weakness; low back pain    Onset Date: 20  Total # of Visits Approved: 12 Per Physician Order  Total # of Visits to Date: 8  No Show: 0  Canceled Appointment: 0    Pre-Treatment Pain:  0/10  Subjective: Pt states she is back at work now after a weeks vacation but was able to leave early today d/t a bad HA. Pt stataes she took a nap which usually helps but its just not today. Pt denies leg/ back pain at rest but just doesnt feel good d/t HA. Exercises:  Exercise 2: Scifit x10 min, hills, level 3.0  Exercise 7: Sit/stands with 5#, 2x10  Exercise 8: Supine LTR, bridges, PPT x15 ea hip ABD 15x ea  Exercise 10: sideways amb at counter 3 laps BTB  Exercise 13: debra retro 14# 5x  Exercise 14: FSU/ LSU 15x ea 6 inch  Exercise 15: sideways amb GTB 3 laps    Assessment  Assessment: Progressions limited this session d/t HA. Continued working on hip and leg strengthening and endurance. Activity Tolerance  Activity Tolerance: Patient tolerated treatment well    Patient Education  Patient Education: Exercise rationale. Pt verbalized/demonstrated good understanding:     [x] Yes         [] No, pt required further clarification. Post Treatment Pain:  0/10    Plan  Plan Frequency: 2  Plan weeks: 6     Goals  (Total # of Visits to Date: 8)      Short Term Goals  Time Frame for Short term goals: 3 weeks  Short term goal 1: Patient to initiate HEP for improved core strength and lumbar stability. -met  Short term goal 2: Patient to be instructed in lumbar ROM and core strengthening exercise to improve lumbar stability.-met/cont  Short term goal 3: Initiate manual techniques/modalities prn to decrease pain and improve mobility.-met/cont    Long Term Goals  Time Frame for Long term goals : 6 weeks  Long term goal 1: Patient to be independent and compliant with HEP. Long term goal 2: Patient to have improved core and B hip strength >/=4+/5 grossly for improved lumbar stability. Long term goal 3: Patient to have improved lumbar AROM flexion: to floor and B SB to knees with no increase in pain to improve functional mobility. Long term goal 4: Patient to report >/=75% improvement in symptoms and be able to work a 10 hour shift with no increase in pain to improve QOL. Long term goal 5: Patient to be able to ascend/descend a full flight of stairs with no increase in pain or weakness in B thighs to improve mobility.     Minutes Tracking:  Time In: 3031  Time Out: 1526  Minutes: 39  Timed Code Treatment Minutes: [id number] [address]           Date: 7/12/2022

## 2022-07-14 ENCOUNTER — HOSPITAL ENCOUNTER (OUTPATIENT)
Dept: PHYSICAL THERAPY | Age: 52
Setting detail: THERAPIES SERIES
Discharge: HOME | End: 2022-07-14
Payer: COMMERCIAL

## 2022-11-23 NOTE — DISCHARGE SUMMARY
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; LE weakness, G83.10  Treatment Diagnosis: Generalized weakness; low back pain      Date Treatment Initiated: 22  Date of Last Treatment: 22      PT Visit Information  Onset Date: 20  PT Insurance Information: BCBS  Total # of Visits Approved: 12  Total # of Visits to Date: 8  Plan of Care/Certification Expiration Date: 22  No Show: 1  Canceled Appointment: 0  Referring Provider (secondary): Deepthi Quintero MD      Frequency/Duration  Plan Frequency: 2 times per week  Plan weeks: 6 weeks      Treatment Received  [x] HP/CP      [x] Electrical Stim   [x] Therapeutic Exercise      [x] Gait Training  [x] Aquatics   [] Ultrasound         [x] Patient Education/HEP   [x] Manual Therapy  [x] Traction    [x] Neuro-lulu        [x] Soft Tissue Mobs            [] Home TENS  [] Iontophoresis    [] Orthotic casting/fitting      [] Dry Needling    Assessment  Assessment: Patient attended 8 PT visits for general weakness and low back pain and met short term goals for initiating HEP and manual techniques/modalities and made good progress toward long term goals for improved ROM and strength. Pt then returned to work and did not return for further PT. Will dc patient at this time. Goals  Short Term Goals  Time Frame for Short Term Goals: 3 weeks  Short Term Goal 1: Patient to initiate HEP for improved core strength and lumbar stability. -met  Short Term Goal 2: Patient to be instructed in lumbar ROM and core strengthening exercise to improve lumbar stability.-met/cont  Short Term Goal 3: Initiate manual techniques/modalities prn to decrease pain and improve mobility.-met/cont    Long Term Goals  Time Frame for Long Term Goals : 6 weeks  Long Term Goal 1: Patient to be independent and compliant with HEP. Long Term Goal 2: Patient to have improved core and B hip strength >/=4+/5 grossly for improved lumbar stability. Long Term Goal 3: Patient to have improved lumbar AROM flexion: to floor and B SB to knees with no increase in pain to improve functional mobility. Long Term Goal 4: Patient to report >/=75% improvement in symptoms and be able to work a 10 hour shift with no increase in pain to improve QOL. Long Term Goal 5: Patient to be able to ascend/descend a full flight of stairs with no increase in pain or weakness in B thighs to improve mobility.       Reason for Discharge  [] Goals Achieved                        []  Poor Follow Through/Attendance                  [x]  Optimal Function Achieved     [x]  Patient Discharged Self    []  Hospitalization                         []  Physician discharge      Thank you for this referral      Alireza Silva, PT, DPT               Date: 11/23/2022

## 2023-01-04 ENCOUNTER — HOSPITAL ENCOUNTER (OUTPATIENT)
Dept: WOMENS IMAGING | Age: 53
Discharge: HOME | End: 2023-01-06
Payer: COMMERCIAL

## 2023-01-04 DIAGNOSIS — Z12.31 BREAST CANCER SCREENING BY MAMMOGRAM: ICD-10-CM

## 2023-01-04 PROCEDURE — 77067 SCR MAMMO BI INCL CAD: CPT

## 2023-08-14 ENCOUNTER — HOSPITAL ENCOUNTER (OUTPATIENT)
Age: 53
Discharge: HOME | End: 2023-08-16
Payer: COMMERCIAL

## 2023-08-14 DIAGNOSIS — I49.8 OTHER CARDIAC ARRHYTHMIA: ICD-10-CM

## 2023-08-14 DIAGNOSIS — R06.02 SHORTNESS OF BREATH: ICD-10-CM

## 2023-08-14 PROCEDURE — 93242 EXT ECG>48HR<7D RECORDING: CPT

## 2024-02-16 ENCOUNTER — HOSPITAL ENCOUNTER (OUTPATIENT)
Dept: WOMENS IMAGING | Age: 54
Discharge: HOME | End: 2024-02-16
Payer: COMMERCIAL

## 2024-02-16 VITALS — WEIGHT: 180 LBS | HEIGHT: 63 IN | BODY MASS INDEX: 31.89 KG/M2

## 2024-02-16 DIAGNOSIS — Z12.39 SCREENING BREAST EXAMINATION: ICD-10-CM

## 2024-02-16 PROCEDURE — 77063 BREAST TOMOSYNTHESIS BI: CPT

## 2024-05-14 ENCOUNTER — APPOINTMENT (OUTPATIENT)
Dept: GENERAL RADIOLOGY | Age: 54
End: 2024-05-14
Payer: COMMERCIAL

## 2024-05-14 ENCOUNTER — APPOINTMENT (OUTPATIENT)
Dept: CT IMAGING | Age: 54
End: 2024-05-14
Payer: COMMERCIAL

## 2024-05-14 ENCOUNTER — HOSPITAL ENCOUNTER (EMERGENCY)
Age: 54
Discharge: HOME | End: 2024-05-14
Attending: EMERGENCY MEDICINE
Payer: COMMERCIAL

## 2024-05-14 VITALS
BODY MASS INDEX: 31.89 KG/M2 | HEART RATE: 74 BPM | TEMPERATURE: 98.1 F | DIASTOLIC BLOOD PRESSURE: 66 MMHG | SYSTOLIC BLOOD PRESSURE: 128 MMHG | WEIGHT: 180 LBS | OXYGEN SATURATION: 99 % | RESPIRATION RATE: 16 BRPM | HEIGHT: 63 IN

## 2024-05-14 DIAGNOSIS — R91.8 PULMONARY NODULES: Primary | ICD-10-CM

## 2024-05-14 LAB
ANION GAP SERPL CALCULATED.3IONS-SCNC: 13 MMOL/L (ref 9–17)
BACTERIA URNS QL MICRO: ABNORMAL
BASOPHILS # BLD: 0.04 K/UL (ref 0–0.2)
BASOPHILS NFR BLD: 1 % (ref 0–2)
BILIRUB UR QL STRIP: NEGATIVE
BUN SERPL-MCNC: 16 MG/DL (ref 6–20)
BUN/CREAT SERPL: 32 (ref 9–20)
CALCIUM SERPL-MCNC: 9 MG/DL (ref 8.6–10.4)
CHLORIDE SERPL-SCNC: 103 MMOL/L (ref 98–107)
CLARITY UR: CLEAR
CO2 SERPL-SCNC: 25 MMOL/L (ref 20–31)
COLOR UR: YELLOW
CREAT SERPL-MCNC: 0.5 MG/DL (ref 0.5–0.9)
EKG ATRIAL RATE: 71 BPM
EKG P AXIS: 46 DEGREES
EKG P-R INTERVAL: 164 MS
EKG Q-T INTERVAL: 392 MS
EKG QRS DURATION: 94 MS
EKG QTC CALCULATION (BAZETT): 425 MS
EKG R AXIS: -9 DEGREES
EKG T AXIS: 23 DEGREES
EKG VENTRICULAR RATE: 71 BPM
EOSINOPHIL # BLD: 0.32 K/UL (ref 0–0.44)
EOSINOPHILS RELATIVE PERCENT: 4 % (ref 1–4)
EPI CELLS #/AREA URNS HPF: ABNORMAL /HPF (ref 0–25)
ERYTHROCYTE [DISTWIDTH] IN BLOOD BY AUTOMATED COUNT: 13.3 % (ref 11.8–14.4)
GFR, ESTIMATED: >90 ML/MIN/1.73M2
GLUCOSE SERPL-MCNC: 89 MG/DL (ref 70–99)
GLUCOSE UR STRIP-MCNC: NEGATIVE MG/DL
HCT VFR BLD AUTO: 45.8 % (ref 36.3–47.1)
HGB BLD-MCNC: 15.1 G/DL (ref 11.9–15.1)
HGB UR QL STRIP.AUTO: ABNORMAL
IMM GRANULOCYTES # BLD AUTO: <0.03 K/UL (ref 0–0.3)
IMM GRANULOCYTES NFR BLD: 0 %
KETONES UR STRIP-MCNC: NEGATIVE MG/DL
LEUKOCYTE ESTERASE UR QL STRIP: NEGATIVE
LYMPHOCYTES NFR BLD: 2.14 K/UL (ref 1.1–3.7)
LYMPHOCYTES RELATIVE PERCENT: 29 % (ref 24–43)
MCH RBC QN AUTO: 29.9 PG (ref 25.2–33.5)
MCHC RBC AUTO-ENTMCNC: 33 G/DL (ref 28.4–34.8)
MCV RBC AUTO: 90.7 FL (ref 82.6–102.9)
MONOCYTES NFR BLD: 0.55 K/UL (ref 0.1–1.2)
MONOCYTES NFR BLD: 7 % (ref 3–12)
NEUTROPHILS NFR BLD: 59 % (ref 36–65)
NEUTS SEG NFR BLD: 4.4 K/UL (ref 1.5–8.1)
NITRITE UR QL STRIP: NEGATIVE
NRBC BLD-RTO: 0 PER 100 WBC
PH UR STRIP: 7 [PH] (ref 5–9)
PLATELET # BLD AUTO: 247 K/UL (ref 138–453)
PMV BLD AUTO: 9.7 FL (ref 8.1–13.5)
POTASSIUM SERPL-SCNC: 3.6 MMOL/L (ref 3.7–5.3)
PROT UR STRIP-MCNC: NEGATIVE MG/DL
RBC # BLD AUTO: 5.05 M/UL (ref 3.95–5.11)
RBC #/AREA URNS HPF: ABNORMAL /HPF (ref 0–2)
SODIUM SERPL-SCNC: 141 MMOL/L (ref 135–144)
SP GR UR STRIP: 1.01 (ref 1.01–1.02)
TROPONIN I SERPL HS-MCNC: <6 NG/L (ref 0–14)
UROBILINOGEN UR STRIP-ACNC: NORMAL EU/DL (ref 0–1)
WBC #/AREA URNS HPF: ABNORMAL /HPF (ref 0–5)
WBC OTHER # BLD: 7.5 K/UL (ref 3.5–11.3)

## 2024-05-14 PROCEDURE — 93010 ELECTROCARDIOGRAM REPORT: CPT | Performed by: INTERNAL MEDICINE

## 2024-05-14 PROCEDURE — 80048 BASIC METABOLIC PNL TOTAL CA: CPT

## 2024-05-14 PROCEDURE — 81001 URINALYSIS AUTO W/SCOPE: CPT

## 2024-05-14 PROCEDURE — 36415 COLL VENOUS BLD VENIPUNCTURE: CPT

## 2024-05-14 PROCEDURE — 6360000004 HC RX CONTRAST MEDICATION: Performed by: EMERGENCY MEDICINE

## 2024-05-14 PROCEDURE — 93005 ELECTROCARDIOGRAM TRACING: CPT | Performed by: EMERGENCY MEDICINE

## 2024-05-14 PROCEDURE — 6370000000 HC RX 637 (ALT 250 FOR IP): Performed by: EMERGENCY MEDICINE

## 2024-05-14 PROCEDURE — 71045 X-RAY EXAM CHEST 1 VIEW: CPT

## 2024-05-14 PROCEDURE — 71260 CT THORAX DX C+: CPT

## 2024-05-14 PROCEDURE — 96374 THER/PROPH/DIAG INJ IV PUSH: CPT

## 2024-05-14 PROCEDURE — 96375 TX/PRO/DX INJ NEW DRUG ADDON: CPT

## 2024-05-14 PROCEDURE — 85025 COMPLETE CBC W/AUTO DIFF WBC: CPT

## 2024-05-14 PROCEDURE — 6360000002 HC RX W HCPCS: Performed by: EMERGENCY MEDICINE

## 2024-05-14 PROCEDURE — 84484 ASSAY OF TROPONIN QUANT: CPT

## 2024-05-14 PROCEDURE — 99285 EMERGENCY DEPT VISIT HI MDM: CPT

## 2024-05-14 RX ORDER — FENTANYL CITRATE 50 UG/ML
25 INJECTION, SOLUTION INTRAMUSCULAR; INTRAVENOUS ONCE
Status: COMPLETED | OUTPATIENT
Start: 2024-05-14 | End: 2024-05-14

## 2024-05-14 RX ORDER — METOPROLOL SUCCINATE 25 MG/1
25 TABLET, EXTENDED RELEASE ORAL DAILY
COMMUNITY
Start: 2024-02-14

## 2024-05-14 RX ORDER — POTASSIUM CHLORIDE 20 MEQ/1
20 TABLET, EXTENDED RELEASE ORAL DAILY
COMMUNITY
Start: 2023-08-23

## 2024-05-14 RX ORDER — KETOROLAC TROMETHAMINE 30 MG/ML
30 INJECTION, SOLUTION INTRAMUSCULAR; INTRAVENOUS ONCE
Status: COMPLETED | OUTPATIENT
Start: 2024-05-14 | End: 2024-05-14

## 2024-05-14 RX ORDER — OXYCODONE HYDROCHLORIDE AND ACETAMINOPHEN 5; 325 MG/1; MG/1
1 TABLET ORAL ONCE
Status: COMPLETED | OUTPATIENT
Start: 2024-05-14 | End: 2024-05-14

## 2024-05-14 RX ORDER — CELECOXIB 200 MG/1
200 CAPSULE ORAL DAILY
COMMUNITY
Start: 2023-10-29

## 2024-05-14 RX ADMIN — KETOROLAC TROMETHAMINE 30 MG: 30 INJECTION, SOLUTION INTRAMUSCULAR at 17:58

## 2024-05-14 RX ADMIN — IOPAMIDOL 75 ML: 755 INJECTION, SOLUTION INTRAVENOUS at 18:02

## 2024-05-14 RX ADMIN — OXYCODONE HYDROCHLORIDE AND ACETAMINOPHEN 1 TABLET: 5; 325 TABLET ORAL at 19:05

## 2024-05-14 RX ADMIN — FENTANYL CITRATE 25 MCG: 50 INJECTION INTRAMUSCULAR; INTRAVENOUS at 19:03

## 2024-05-14 ASSESSMENT — PAIN DESCRIPTION - LOCATION: LOCATION: CHEST

## 2024-05-14 ASSESSMENT — PAIN DESCRIPTION - FREQUENCY: FREQUENCY: CONTINUOUS

## 2024-05-14 ASSESSMENT — PAIN SCALES - GENERAL
PAINLEVEL_OUTOF10: 5
PAINLEVEL_OUTOF10: 7

## 2024-05-14 ASSESSMENT — PAIN - FUNCTIONAL ASSESSMENT: PAIN_FUNCTIONAL_ASSESSMENT: 0-10

## 2024-05-14 ASSESSMENT — PAIN DESCRIPTION - ORIENTATION: ORIENTATION: LEFT

## 2024-05-14 NOTE — DISCHARGE INSTRUCTIONS
Follow-up with your primary care physician in the next 24 to 48 hours.  If symptoms worsen in any way please return to the emergency department for reevaluation

## 2024-05-14 NOTE — ED PROVIDER NOTES
Extraocular movements intact.      Pupils: Pupils are equal, round, and reactive to light.   Cardiovascular:      Rate and Rhythm: Normal rate.   Pulmonary:      Effort: Pulmonary effort is normal. No respiratory distress.      Breath sounds: No stridor. No wheezing, rhonchi or rales.   Chest:      Chest wall: No tenderness.   Abdominal:      Tenderness: Negative signs include Urban's sign, Rovsing's sign, McBurney's sign, psoas sign and obturator sign.      Comments: Tenderness to her Lower left lower rib cage, and to the left upper quadrant.  He does seem to be some asymmetry, and edema noted to the left lower rib cage.  Anteriorly and into the flank.  No rash or skin changes noted to this area.   Musculoskeletal:      Right lower leg: No edema.      Left lower leg: No edema.   Neurological:      General: No focal deficit present.      Mental Status: She is alert and oriented to person, place, and time.           DDX/DIAGNOSTIC RESULTS / EMERGENCY DEPARTMENT COURSE / MDM     Medical Decision Making  Patient with left upper quadrant and left flank pain as well as left lower rib cage pain.  No reported trauma.  Her symptoms could be pulmonary versus abdominal.  Will plan on cardiac labs given the location, pain control, will also check urine analysis is given that the pain does wrap around to the back concern for possible nephrolithiasis.    Amount and/or Complexity of Data Reviewed  Labs: ordered.  Radiology: ordered.  ECG/medicine tests: ordered.    Risk  Prescription drug management.        EKG  Normal sinus rhythm poor R wave progression, T wave inversion noted in lead III, no other EKGs for comparison    All EKG's are interpreted by the Emergency Department Physician who either signs or Co-signs this chart in the absence of a cardiologist.    EMERGENCY DEPARTMENT COURSE:  Patient chest x-ray did show a concern for possible infiltrate in the left lower lobe, so CT of her chest was ordered.  Will await urine

## 2024-06-05 ENCOUNTER — HOSPITAL ENCOUNTER (OUTPATIENT)
Dept: ULTRASOUND IMAGING | Age: 54
Discharge: HOME | End: 2024-06-07
Payer: COMMERCIAL

## 2024-06-05 DIAGNOSIS — E06.3 HYPOTHYROIDISM DUE TO HASHIMOTO'S THYROIDITIS: ICD-10-CM

## 2024-06-05 DIAGNOSIS — E03.8 HYPOTHYROIDISM DUE TO HASHIMOTO'S THYROIDITIS: ICD-10-CM

## 2024-06-05 PROCEDURE — 76536 US EXAM OF HEAD AND NECK: CPT

## 2025-03-28 ENCOUNTER — HOSPITAL ENCOUNTER (OUTPATIENT)
Dept: WOMENS IMAGING | Age: 55
Discharge: HOME | End: 2025-03-30
Payer: COMMERCIAL

## 2025-03-28 VITALS — WEIGHT: 179 LBS | BODY MASS INDEX: 31.71 KG/M2 | HEIGHT: 63 IN

## 2025-03-28 DIAGNOSIS — Z12.31 BREAST CANCER SCREENING BY MAMMOGRAM: ICD-10-CM

## 2025-03-28 PROCEDURE — 77063 BREAST TOMOSYNTHESIS BI: CPT

## (undated) DEVICE — CANNULA ORAL NSL AD CO2 N INTUB O2 DEL DISP TRU LNK

## (undated) DEVICE — SOLUTION IV IRRIG POUR BRL 0.9% SODIUM CHL 2F7124

## (undated) DEVICE — FORCEPS BX L240CM JAW DIA2.4MM ORNG L CAP W/ NDL DISP RAD

## (undated) DEVICE — MEDI-VAC NON-CONDUCTIVE TUBING7MM X 30.5 (100FT): Brand: CARDINAL HEALTH